# Patient Record
Sex: FEMALE | Race: WHITE | NOT HISPANIC OR LATINO | URBAN - METROPOLITAN AREA
[De-identification: names, ages, dates, MRNs, and addresses within clinical notes are randomized per-mention and may not be internally consistent; named-entity substitution may affect disease eponyms.]

---

## 2018-03-19 ENCOUNTER — IMPORTED ENCOUNTER (OUTPATIENT)
Dept: URBAN - METROPOLITAN AREA CLINIC 31 | Facility: CLINIC | Age: 68
End: 2018-03-19

## 2018-03-19 PROBLEM — H18.51: Noted: 2018-03-19

## 2018-03-19 PROBLEM — H43.813: Noted: 2018-03-19

## 2018-03-19 PROBLEM — E11.9: Noted: 2018-03-19

## 2018-03-19 PROBLEM — H25.013: Noted: 2018-03-19

## 2018-03-19 PROCEDURE — 92014 COMPRE OPH EXAM EST PT 1/>: CPT

## 2018-03-19 NOTE — PATIENT DISCUSSION
1.  Fuchs Dystrophy OU: Recommend clinical observation. Advised use of Helen 128 drops in affected eye if worsening. 2. PVD OU:  Patient was cautioned to call our office immediately if they experience a substantial change in their symptoms such as an increase in floaters persistent flashes loss of visual field (may appear as a shadow or a curtain) or decrease in visual acuity as these may indicate a retinal tear or detachment. If this is a new problem patient will need to return for re-examination  as determined by the [address]. Cataract OU: Explained how cataracts can effect vision. Recommend clinical observation. The patient was advised to contact us if any change or worsening of vision. 4.  DM II without sign of Diabetic Retinopathy OU:  Discussed the pathophysiology of diabetes and its effect on the eye. Stressed the importance of regular followup and good control of BS BP and Lipids to avoid future complications. 5. Return for an appointment in 12 months for comprehensive exam. ECC. with Dr. Mandie Yusuf.

## 2019-05-10 ENCOUNTER — IMPORTED ENCOUNTER (OUTPATIENT)
Dept: URBAN - METROPOLITAN AREA CLINIC 31 | Facility: CLINIC | Age: 69
End: 2019-05-10

## 2019-05-10 PROBLEM — H18.51: Noted: 2019-05-10

## 2019-05-10 PROBLEM — H43.813: Noted: 2019-05-10

## 2019-05-10 PROBLEM — E11.9: Noted: 2019-05-10

## 2019-05-10 PROBLEM — H25.13: Noted: 2019-05-10

## 2019-05-10 PROCEDURE — 92014 COMPRE OPH EXAM EST PT 1/>: CPT

## 2019-05-10 PROCEDURE — 92286 ANT SGM IMG I&R SPECLR MIC: CPT

## 2019-05-10 NOTE — PATIENT DISCUSSION
1.  Fuchs Dystrophy OU: Recommend clinical observation. Advised use of Helen 128 drops in affected eye if worsening. 2.  DM II without sign of Diabetic Retinopathy OU:  Discussed the pathophysiology of diabetes and its effect on the eye. Stressed the importance of regular followup and good control of BS BP and Lipids to avoid future complications. 3. Nuclear Sclerotic Cataract OU: Explained how cataracts can effect vision. Recommend clinical observation. The patient was advised to contact us if any change or worsening of vision. 4. PVD OU:  Patient was cautioned to call our office immediately if they experience a substantial change in their symptoms such as an increase in floaters persistent flashes loss of visual field (may appear as a shadow or a curtain) or decrease in visual acuity as these may indicate a retinal tear or detachment. If this is a new problem patient will need to return for re-examination  as determined by the [address]. Return for an appointment in 12 months for comprehensive exam. with Dr. Camilla Crigler.

## 2020-06-12 ENCOUNTER — IMPORTED ENCOUNTER (OUTPATIENT)
Dept: URBAN - METROPOLITAN AREA CLINIC 31 | Facility: CLINIC | Age: 70
End: 2020-06-12

## 2020-06-12 PROBLEM — H25.13: Noted: 2020-06-12

## 2020-06-12 PROBLEM — H18.51: Noted: 2020-06-12

## 2020-06-12 PROBLEM — E11.9: Noted: 2020-06-12

## 2020-06-12 PROCEDURE — 92014 COMPRE OPH EXAM EST PT 1/>: CPT

## 2020-06-12 PROCEDURE — 92015 DETERMINE REFRACTIVE STATE: CPT

## 2020-06-12 NOTE — PATIENT DISCUSSION
1.  Fuchs Dystrophy OU: Recommend clinical observation. Advised use of Helen 128 drops in affected eye if worsening. 2. Nuclear Sclerotic Cataract OU: Explained how cataracts can effect vision. Recommend clinical observation. The patient was advised to contact us if any change or worsening of vision. 3.  DM II without sign of Diabetic Retinopathy OU:  Discussed the pathophysiology of diabetes and its effect on the eye. Stressed the importance of regular followup and good control of BS BP and Lipids to avoid future complications. 4. Return for an appointment in 12 months for comprehensive exam. with Dr. Renee Gorman.

## 2021-07-20 ENCOUNTER — IMPORTED ENCOUNTER (OUTPATIENT)
Dept: URBAN - METROPOLITAN AREA CLINIC 31 | Facility: CLINIC | Age: 71
End: 2021-07-20

## 2021-07-20 PROCEDURE — 92014 COMPRE OPH EXAM EST PT 1/>: CPT

## 2021-07-20 PROCEDURE — 92015 DETERMINE REFRACTIVE STATE: CPT

## 2021-07-20 NOTE — PATIENT DISCUSSION
SEE SCANNED EXAM.Return for an appointment in 12 months for comprehensive exam. ECC. with Dr. Tyler Jalloh.

## 2022-04-02 ASSESSMENT — VISUAL ACUITY
OD_SC: 20/30-2
OS_SC: 20/20
OS_CC: J1
OS_CC: 20/60-2
OS_SC: 20/20
OD_CC: 20/60-2
OD_CC: J1+
OU_CC: J115''
OD_SC: 20/25-3

## 2022-04-02 ASSESSMENT — TONOMETRY
OD_IOP_MMHG: 14
OS_IOP_MMHG: 14
OD_IOP_MMHG: 13
OD_IOP_MMHG: 14
OD_IOP_MMHG: 11
OS_IOP_MMHG: 15
OS_IOP_MMHG: 12
OS_IOP_MMHG: 11

## 2022-10-25 NOTE — PATIENT DISCUSSION
Reviewed that they tend to [address] over a few weeks, and then may not happen for many years. Patient instructed to return if they do not clear over a few weeks for further evaluation.

## 2023-09-01 ENCOUNTER — COMPREHENSIVE EXAM (OUTPATIENT)
Dept: URBAN - METROPOLITAN AREA CLINIC 29 | Facility: CLINIC | Age: 73
End: 2023-09-01

## 2023-09-01 DIAGNOSIS — E11.9: ICD-10-CM

## 2023-09-01 DIAGNOSIS — H43.813: ICD-10-CM

## 2023-09-01 DIAGNOSIS — H25.813: ICD-10-CM

## 2023-09-01 DIAGNOSIS — H18.513: ICD-10-CM

## 2023-09-01 DIAGNOSIS — H35.372: ICD-10-CM

## 2023-09-01 PROCEDURE — 92015 DETERMINE REFRACTIVE STATE: CPT

## 2023-09-01 PROCEDURE — 92286 ANT SGM IMG I&R SPECLR MIC: CPT

## 2023-09-01 PROCEDURE — 92014 COMPRE OPH EXAM EST PT 1/>: CPT

## 2023-09-01 ASSESSMENT — VISUAL ACUITY
OU_CC: 20/25
OD_CC: 20/20
OS_CC: 20/20-1

## 2023-09-01 ASSESSMENT — TONOMETRY
OD_IOP_MMHG: 10
OS_IOP_MMHG: 10

## 2024-08-28 ENCOUNTER — COMPREHENSIVE EXAM (OUTPATIENT)
Dept: URBAN - METROPOLITAN AREA CLINIC 29 | Facility: CLINIC | Age: 74
End: 2024-08-28

## 2024-08-28 DIAGNOSIS — H25.813: ICD-10-CM

## 2024-08-28 DIAGNOSIS — H35.372: ICD-10-CM

## 2024-08-28 DIAGNOSIS — E11.9: ICD-10-CM

## 2024-08-28 DIAGNOSIS — H43.813: ICD-10-CM

## 2024-08-28 DIAGNOSIS — H18.513: ICD-10-CM

## 2024-08-28 PROCEDURE — 92286 ANT SGM IMG I&R SPECLR MIC: CPT

## 2024-08-28 PROCEDURE — 92014 COMPRE OPH EXAM EST PT 1/>: CPT

## 2024-08-28 PROCEDURE — 92134 CPTRZ OPH DX IMG PST SGM RTA: CPT

## 2024-08-28 ASSESSMENT — VISUAL ACUITY
OS_CC: 20/20
OD_CC: 20/20-1

## 2024-08-28 ASSESSMENT — TONOMETRY
OD_IOP_MMHG: 11
OS_IOP_MMHG: 11